# Patient Record
Sex: FEMALE | Race: WHITE | NOT HISPANIC OR LATINO | Employment: OTHER | URBAN - NONMETROPOLITAN AREA
[De-identification: names, ages, dates, MRNs, and addresses within clinical notes are randomized per-mention and may not be internally consistent; named-entity substitution may affect disease eponyms.]

---

## 2024-02-07 ENCOUNTER — OFFICE VISIT (OUTPATIENT)
Dept: CARDIOLOGY | Facility: CLINIC | Age: 69
End: 2024-02-07
Payer: MEDICARE

## 2024-02-07 VITALS
BODY MASS INDEX: 29.98 KG/M2 | SYSTOLIC BLOOD PRESSURE: 160 MMHG | DIASTOLIC BLOOD PRESSURE: 108 MMHG | WEIGHT: 191 LBS | HEIGHT: 67 IN | HEART RATE: 59 BPM

## 2024-02-07 DIAGNOSIS — R94.31 ABNORMAL EKG: ICD-10-CM

## 2024-02-07 DIAGNOSIS — I10 PRIMARY HYPERTENSION: ICD-10-CM

## 2024-02-07 DIAGNOSIS — Z78.9 NEVER SMOKED ANY SUBSTANCE: ICD-10-CM

## 2024-02-07 PROBLEM — M65.331 ACQUIRED TRIGGER FINGER OF RIGHT MIDDLE FINGER: Status: ACTIVE | Noted: 2018-02-26

## 2024-02-07 PROBLEM — H65.23 BILATERAL CHRONIC SEROUS OTITIS MEDIA: Status: ACTIVE | Noted: 2023-08-11

## 2024-02-07 PROBLEM — E78.2 MIXED HYPERLIPIDEMIA: Status: ACTIVE | Noted: 2023-08-29

## 2024-02-07 PROBLEM — R53.82 CHRONIC FATIGUE: Status: ACTIVE | Noted: 2023-08-11

## 2024-02-07 PROCEDURE — 1036F TOBACCO NON-USER: CPT | Performed by: INTERNAL MEDICINE

## 2024-02-07 PROCEDURE — 1160F RVW MEDS BY RX/DR IN RCRD: CPT | Performed by: INTERNAL MEDICINE

## 2024-02-07 PROCEDURE — 3077F SYST BP >= 140 MM HG: CPT | Performed by: INTERNAL MEDICINE

## 2024-02-07 PROCEDURE — 93000 ELECTROCARDIOGRAM COMPLETE: CPT | Performed by: INTERNAL MEDICINE

## 2024-02-07 PROCEDURE — 99204 OFFICE O/P NEW MOD 45 MIN: CPT | Performed by: INTERNAL MEDICINE

## 2024-02-07 PROCEDURE — 1159F MED LIST DOCD IN RCRD: CPT | Performed by: INTERNAL MEDICINE

## 2024-02-07 PROCEDURE — 3080F DIAST BP >= 90 MM HG: CPT | Performed by: INTERNAL MEDICINE

## 2024-02-07 RX ORDER — MULTIVITAMIN
1 TABLET ORAL
COMMUNITY
Start: 2018-11-16

## 2024-02-07 RX ORDER — LISINOPRIL 10 MG/1
10 TABLET ORAL
COMMUNITY
Start: 2023-12-08 | End: 2024-02-07 | Stop reason: SINTOL

## 2024-02-07 RX ORDER — AA/PROT/LYSINE/METHIO/VIT C/B6 50-12.5 MG
10 TABLET ORAL
COMMUNITY
Start: 2018-11-16

## 2024-02-07 RX ORDER — LOSARTAN POTASSIUM 50 MG/1
50 TABLET ORAL DAILY
Qty: 90 TABLET | Refills: 3 | Status: SHIPPED | OUTPATIENT
Start: 2024-02-07 | End: 2025-02-06

## 2024-02-07 NOTE — PATIENT INSTRUCTIONS
Please bring all medicines, vitamins, and herbal supplements with you when you come to the office.    Prescriptions will not be filled unless you are compliant with your follow up appointments or have a follow up appointment scheduled as per instruction of your physician. Refills should be requested at the time of your visit.     Follow up ordered as needed only

## 2024-02-07 NOTE — PROGRESS NOTES
Cardiology Consultation- New Consult    Reason for referral:     HPI: Holly Pulliam is a 68 y.o. female seen in cardiology consultation at the request of herself for further evaluation and management regarding stress testing that was ordered by her primary care nurse practitioner in August, performed in September and read and performed at [facility].  She has no results of the stress test.  This was performed originally for accelerated hypertension and what I believed to be an abnormal baseline ECG    There is no prior history of myocardial infarction, revascularization, stroke, thromboembolic or bleeding disorder.  There is no history of tobacco or diabetes and no family history of precocious coronary artery disease    Stress perfusion imaging and treadmill stress test are both reviewed in detail.  She performed at 9.3 METS on the Conrad protocol, stress perfusion exam was completely normal with a normal ejection fraction.    Blood pressure remains elevated on medium dose lisinopril 10 mg daily and she has a cough with the lisinopril.    Her overall risk for cardiovascular events is low given her lack of comorbidities other than hypertension and her recent normal perfusion scan.    Recommendations: Discontinue lisinopril, initiate losartan 50 mg daily, she can follow-up with her primary care nurse practitioner for continued antihypertensive treatment and management.  I will be available as needed for any cardiovascular issues.  I do not believe she necessarily warrants aspirin daily at this juncture.      Past Medical History:   She has no past medical history on file.    Surgical History:   She has a past surgical history that includes Skin biopsy; CT head W O contrast trauma protocol; Colonoscopy; Dental surgery; [place] tooth extraction;  section, classic; Partial hysterectomy; and Appendectomy.    Family History:   Family History   Problem Relation Name Age of Onset    Hypertension Mother       "Dementia Mother      Heart failure Father      Atrial fibrillation Father      Hypertension Sister      Skin cancer Sister      Hypertension Brother      Cancer Brother      Blood Disorder Brother         Social History:   Social History     Tobacco Use    Smoking status: Never    Smokeless tobacco: Never   Substance Use Topics    Alcohol use: Never        Allergies:  Morphine     Current Medications:    Current Outpatient Medications:     calcium carb/magnesium ox,carb (SAADIA-MAG ORAL), Take by mouth., Disp: , Rfl:     coenzyme Q-10 10 mg capsule, Take 1 capsule (10 mg) by mouth once daily., Disp: , Rfl:     DOCOSAHEXAENOIC ACID ORAL, Take 1 capsule by mouth once daily., Disp: , Rfl:     multivitamin tablet, Take 1 tablet by mouth once daily., Disp: , Rfl:      Vitals:  Visit Vitals  BP (!) 160/108 (BP Location: Left arm, Patient Position: Sitting)   Pulse 59   Ht 1.702 m (5' 7\")   Wt 86.6 kg (191 lb)   BMI 29.91 kg/m²   Smoking Status Never   BSA 2.02 m²    EKG done in office today       Review of Systems   All other systems reviewed and are negative.      Objective         Physical Exam  Constitutional:       Appearance: Normal appearance. She is normal weight.   HENT:      Nose: Nose normal.   Neck:      Vascular: No carotid bruit.   Cardiovascular:      Rate and Rhythm: Normal rate.      Pulses: Normal pulses.      Heart sounds: Normal heart sounds.   Pulmonary:      Effort: Pulmonary effort is normal.   Abdominal:      General: Bowel sounds are normal.      Palpations: Abdomen is soft.   Genitourinary:     Rectum: Normal.   Musculoskeletal:         General: Normal range of motion.      Cervical back: Normal range of motion.      Right lower leg: No edema.      Left lower leg: No edema.   Skin:     General: Skin is warm and dry.   Neurological:      General: No focal deficit present.      Mental Status: She is alert.   Psychiatric:         Mood and Affect: Mood normal.         Behavior: Behavior normal.         " Thought Content: Thought content normal.         Judgment: Judgment normal.                Assessment and Plan:   1. Primary hypertension        2. Abnormal EKG        3. Never smoked any substance                   Scribe Attestation  By signing my name below, Bianca LOPEZ LPN  , Scribe   attest that this documentation has been prepared under the direction and in the presence of Fabrice Zavala DO.

## 2024-02-07 NOTE — LETTER
February 7, 2024     Tomasa Murillo MD  [address] [facility] [address]    Patient: Holly Pulliam   YOB: 1955   Date of Visit: 2/7/2024       Dear Dr. Tomasa Murillo MD:    Thank you for referring Holly Pulliam to me for evaluation. Below are my notes for this consultation.  If you have questions, please do not hesitate to call me. I look forward to following your patient along with you.       Sincerely,     Fabrice Zavala, DO      CC: No Recipients  ______________________________________________________________________________________    Cardiology Consultation- New Consult    Reason for referral:     HPI: Holly Pulliam is a 68 y.o. female seen in cardiology consultation at the request of herself for further evaluation and management regarding stress testing that was ordered by her primary care nurse practitioner in August, performed in September and read and performed at [facility].  She has no results of the stress test.  This was performed originally for accelerated hypertension and what I believed to be an abnormal baseline ECG    There is no prior history of myocardial infarction, revascularization, stroke, thromboembolic or bleeding disorder.  There is no history of tobacco or diabetes and no family history of precocious coronary artery disease    Stress perfusion imaging and treadmill stress test are both reviewed in detail.  She performed at 9.3 METS on the Conrad protocol, stress perfusion exam was completely normal with a normal ejection fraction.    Blood pressure remains elevated on medium dose lisinopril 10 mg daily and she has a cough with the lisinopril.    Her overall risk for cardiovascular events is low given her lack of comorbidities other than hypertension and her recent normal perfusion scan.    Recommendations: Discontinue lisinopril, initiate losartan 50 mg daily, she can follow-up with her primary care nurse practitioner for continued antihypertensive  "treatment and management.  I will be available as needed for any cardiovascular issues.  I do not believe she necessarily warrants aspirin daily at this juncture.      Past Medical History:   She has no past medical history on file.    Surgical History:   She has a past surgical history that includes Skin biopsy; CT head W O contrast trauma protocol; Colonoscopy; Dental surgery; [place] tooth extraction;  section, classic; Partial hysterectomy; and Appendectomy.    Family History:   Family History   Problem Relation Name Age of Onset   • Hypertension Mother     • Dementia Mother     • Heart failure Father     • Atrial fibrillation Father     • Hypertension Sister     • Skin cancer Sister     • Hypertension Brother     • Cancer Brother     • Blood Disorder Brother         Social History:   Social History     Tobacco Use   • Smoking status: Never   • Smokeless tobacco: Never   Substance Use Topics   • Alcohol use: Never        Allergies:  Morphine     Current Medications:    Current Outpatient Medications:   •  calcium carb/magnesium ox,carb (SAADIA-MAG ORAL), Take by mouth., Disp: , Rfl:   •  coenzyme Q-10 10 mg capsule, Take 1 capsule (10 mg) by mouth once daily., Disp: , Rfl:   •  DOCOSAHEXAENOIC ACID ORAL, Take 1 capsule by mouth once daily., Disp: , Rfl:   •  multivitamin tablet, Take 1 tablet by mouth once daily., Disp: , Rfl:      Vitals:  Visit Vitals  BP (!) 160/108 (BP Location: Left arm, Patient Position: Sitting)   Pulse 59   Ht 1.702 m (5' 7\")   Wt 86.6 kg (191 lb)   BMI 29.91 kg/m²   Smoking Status Never   BSA 2.02 m²    EKG done in office today       Review of Systems   All other systems reviewed and are negative.      Objective        Physical Exam  Constitutional:       Appearance: Normal appearance. She is normal weight.   HENT:      Nose: Nose normal.   Neck:      Vascular: No carotid bruit.   Cardiovascular:      Rate and Rhythm: Normal rate.      Pulses: Normal pulses.      Heart sounds: " Normal heart sounds.   Pulmonary:      Effort: Pulmonary effort is normal.   Abdominal:      General: Bowel sounds are normal.      Palpations: Abdomen is soft.   Genitourinary:     Rectum: Normal.   Musculoskeletal:         General: Normal range of motion.      Cervical back: Normal range of motion.      Right lower leg: No edema.      Left lower leg: No edema.   Skin:     General: Skin is warm and dry.   Neurological:      General: No focal deficit present.      Mental Status: She is alert.   Psychiatric:         Mood and Affect: Mood normal.         Behavior: Behavior normal.         Thought Content: Thought content normal.         Judgment: Judgment normal.                Assessment and Plan:   1. Primary hypertension        2. Abnormal EKG        3. Never smoked any substance                   Scribe Attestation  By signing my name below, Bianca LOPEZ LPN  , Scribe   attest that this documentation has been prepared under the direction and in the presence of Fabrice Zavala DO.